# Patient Record
Sex: MALE | Race: BLACK OR AFRICAN AMERICAN | NOT HISPANIC OR LATINO | URBAN - METROPOLITAN AREA
[De-identification: names, ages, dates, MRNs, and addresses within clinical notes are randomized per-mention and may not be internally consistent; named-entity substitution may affect disease eponyms.]

---

## 2021-09-09 ENCOUNTER — INPATIENT (INPATIENT)
Facility: HOSPITAL | Age: 71
LOS: 0 days | Discharge: HOME | DRG: 313 | End: 2021-09-10
Attending: INTERNAL MEDICINE | Admitting: INTERNAL MEDICINE
Payer: MEDICARE

## 2021-09-09 ENCOUNTER — APPOINTMENT (OUTPATIENT)
Dept: HEART AND VASCULAR | Facility: CLINIC | Age: 71
End: 2021-09-09
Payer: MEDICARE

## 2021-09-09 ENCOUNTER — NON-APPOINTMENT (OUTPATIENT)
Age: 71
End: 2021-09-09

## 2021-09-09 VITALS
HEIGHT: 67 IN | TEMPERATURE: 98.3 F | DIASTOLIC BLOOD PRESSURE: 90 MMHG | WEIGHT: 172 LBS | SYSTOLIC BLOOD PRESSURE: 132 MMHG | BODY MASS INDEX: 27 KG/M2

## 2021-09-09 VITALS
TEMPERATURE: 98 F | OXYGEN SATURATION: 96 % | DIASTOLIC BLOOD PRESSURE: 77 MMHG | SYSTOLIC BLOOD PRESSURE: 145 MMHG | HEIGHT: 66 IN | WEIGHT: 175.05 LBS | HEART RATE: 70 BPM | RESPIRATION RATE: 18 BRPM

## 2021-09-09 DIAGNOSIS — R07.9 CHEST PAIN, UNSPECIFIED: ICD-10-CM

## 2021-09-09 DIAGNOSIS — R73.03 PREDIABETES.: ICD-10-CM

## 2021-09-09 DIAGNOSIS — I10 ESSENTIAL (PRIMARY) HYPERTENSION: ICD-10-CM

## 2021-09-09 DIAGNOSIS — Z98.890 OTHER SPECIFIED POSTPROCEDURAL STATES: Chronic | ICD-10-CM

## 2021-09-09 DIAGNOSIS — E78.5 HYPERLIPIDEMIA, UNSPECIFIED: ICD-10-CM

## 2021-09-09 DIAGNOSIS — Z87.891 PERSONAL HISTORY OF NICOTINE DEPENDENCE: ICD-10-CM

## 2021-09-09 DIAGNOSIS — Z80.2 FAMILY HISTORY OF MALIGNANT NEOPLASM OF OTHER RESPIRATORY AND INTRATHORACIC ORGANS: ICD-10-CM

## 2021-09-09 DIAGNOSIS — R73.03 PREDIABETES: ICD-10-CM

## 2021-09-09 DIAGNOSIS — M25.512 PAIN IN LEFT SHOULDER: ICD-10-CM

## 2021-09-09 PROBLEM — Z00.00 ENCOUNTER FOR PREVENTIVE HEALTH EXAMINATION: Status: ACTIVE | Noted: 2021-09-09

## 2021-09-09 LAB
ANION GAP SERPL CALC-SCNC: 10 MMOL/L — SIGNIFICANT CHANGE UP (ref 5–17)
BASOPHILS # BLD AUTO: 0.04 K/UL — SIGNIFICANT CHANGE UP (ref 0–0.2)
BASOPHILS NFR BLD AUTO: 0.5 % — SIGNIFICANT CHANGE UP (ref 0–2)
BUN SERPL-MCNC: 11 MG/DL — SIGNIFICANT CHANGE UP (ref 7–23)
CALCIUM SERPL-MCNC: 9.9 MG/DL — SIGNIFICANT CHANGE UP (ref 8.4–10.5)
CHLORIDE SERPL-SCNC: 108 MMOL/L — SIGNIFICANT CHANGE UP (ref 96–108)
CK MB CFR SERPL CALC: 2.3 NG/ML — SIGNIFICANT CHANGE UP (ref 0–6.7)
CK SERPL-CCNC: 200 U/L — SIGNIFICANT CHANGE UP (ref 30–200)
CO2 SERPL-SCNC: 23 MMOL/L — SIGNIFICANT CHANGE UP (ref 22–31)
CREAT SERPL-MCNC: 1.16 MG/DL — SIGNIFICANT CHANGE UP (ref 0.5–1.3)
EOSINOPHIL # BLD AUTO: 0.06 K/UL — SIGNIFICANT CHANGE UP (ref 0–0.5)
EOSINOPHIL NFR BLD AUTO: 0.8 % — SIGNIFICANT CHANGE UP (ref 0–6)
GLUCOSE SERPL-MCNC: 83 MG/DL — SIGNIFICANT CHANGE UP (ref 70–99)
HCT VFR BLD CALC: 43.8 % — SIGNIFICANT CHANGE UP (ref 39–50)
HGB BLD-MCNC: 14.1 G/DL — SIGNIFICANT CHANGE UP (ref 13–17)
IMM GRANULOCYTES NFR BLD AUTO: 0.3 % — SIGNIFICANT CHANGE UP (ref 0–1.5)
LYMPHOCYTES # BLD AUTO: 2.56 K/UL — SIGNIFICANT CHANGE UP (ref 1–3.3)
LYMPHOCYTES # BLD AUTO: 34.9 % — SIGNIFICANT CHANGE UP (ref 13–44)
MCHC RBC-ENTMCNC: 29 PG — SIGNIFICANT CHANGE UP (ref 27–34)
MCHC RBC-ENTMCNC: 32.2 GM/DL — SIGNIFICANT CHANGE UP (ref 32–36)
MCV RBC AUTO: 89.9 FL — SIGNIFICANT CHANGE UP (ref 80–100)
MONOCYTES # BLD AUTO: 0.73 K/UL — SIGNIFICANT CHANGE UP (ref 0–0.9)
MONOCYTES NFR BLD AUTO: 10 % — SIGNIFICANT CHANGE UP (ref 2–14)
NEUTROPHILS # BLD AUTO: 3.92 K/UL — SIGNIFICANT CHANGE UP (ref 1.8–7.4)
NEUTROPHILS NFR BLD AUTO: 53.5 % — SIGNIFICANT CHANGE UP (ref 43–77)
NRBC # BLD: 0 /100 WBCS — SIGNIFICANT CHANGE UP (ref 0–0)
PLATELET # BLD AUTO: 211 K/UL — SIGNIFICANT CHANGE UP (ref 150–400)
POTASSIUM SERPL-MCNC: 4.4 MMOL/L — SIGNIFICANT CHANGE UP (ref 3.5–5.3)
POTASSIUM SERPL-SCNC: 4.4 MMOL/L — SIGNIFICANT CHANGE UP (ref 3.5–5.3)
RBC # BLD: 4.87 M/UL — SIGNIFICANT CHANGE UP (ref 4.2–5.8)
RBC # FLD: 14.1 % — SIGNIFICANT CHANGE UP (ref 10.3–14.5)
SARS-COV-2 RNA SPEC QL NAA+PROBE: NEGATIVE — SIGNIFICANT CHANGE UP
SODIUM SERPL-SCNC: 141 MMOL/L — SIGNIFICANT CHANGE UP (ref 135–145)
TROPONIN T SERPL-MCNC: <0.01 NG/ML — SIGNIFICANT CHANGE UP (ref 0–0.01)
WBC # BLD: 7.33 K/UL — SIGNIFICANT CHANGE UP (ref 3.8–10.5)
WBC # FLD AUTO: 7.33 K/UL — SIGNIFICANT CHANGE UP (ref 3.8–10.5)

## 2021-09-09 PROCEDURE — 99285 EMERGENCY DEPT VISIT HI MDM: CPT | Mod: 25

## 2021-09-09 PROCEDURE — 93015 CV STRESS TEST SUPVJ I&R: CPT

## 2021-09-09 PROCEDURE — 71045 X-RAY EXAM CHEST 1 VIEW: CPT | Mod: 26

## 2021-09-09 PROCEDURE — 99205 OFFICE O/P NEW HI 60 MIN: CPT | Mod: 25

## 2021-09-09 PROCEDURE — 93010 ELECTROCARDIOGRAM REPORT: CPT

## 2021-09-09 PROCEDURE — 71045 X-RAY EXAM CHEST 1 VIEW: CPT | Mod: 26,77

## 2021-09-09 RX ORDER — OXYCODONE AND ACETAMINOPHEN 5; 325 MG/1; MG/1
1 TABLET ORAL EVERY 6 HOURS
Refills: 0 | Status: DISCONTINUED | OUTPATIENT
Start: 2021-09-09 | End: 2021-09-10

## 2021-09-09 RX ORDER — SODIUM CHLORIDE 9 MG/ML
1000 INJECTION, SOLUTION INTRAVENOUS
Refills: 0 | Status: DISCONTINUED | OUTPATIENT
Start: 2021-09-09 | End: 2021-09-10

## 2021-09-09 RX ORDER — DEXTROSE 50 % IN WATER 50 %
15 SYRINGE (ML) INTRAVENOUS ONCE
Refills: 0 | Status: DISCONTINUED | OUTPATIENT
Start: 2021-09-09 | End: 2021-09-10

## 2021-09-09 RX ORDER — ATORVASTATIN CALCIUM 80 MG/1
1 TABLET, FILM COATED ORAL
Qty: 0 | Refills: 0 | DISCHARGE

## 2021-09-09 RX ORDER — ALPRAZOLAM 0.25 MG
0 TABLET ORAL
Qty: 0 | Refills: 0 | DISCHARGE

## 2021-09-09 RX ORDER — LOSARTAN POTASSIUM 50 MG/1
50 TABLET, FILM COATED ORAL
Refills: 0 | Status: ACTIVE | COMMUNITY

## 2021-09-09 RX ORDER — OXYCODONE AND ACETAMINOPHEN 5; 325 MG/1; MG/1
1 TABLET ORAL EVERY 6 HOURS
Refills: 0 | Status: DISCONTINUED | OUTPATIENT
Start: 2021-09-09 | End: 2021-09-09

## 2021-09-09 RX ORDER — ASPIRIN/CALCIUM CARB/MAGNESIUM 324 MG
325 TABLET ORAL ONCE
Refills: 0 | Status: COMPLETED | OUTPATIENT
Start: 2021-09-09 | End: 2021-09-09

## 2021-09-09 RX ORDER — AMLODIPINE BESYLATE 10 MG/1
10 TABLET ORAL
Refills: 0 | Status: ACTIVE | COMMUNITY

## 2021-09-09 RX ORDER — ALPRAZOLAM 0.25 MG/1
0.25 TABLET ORAL
Refills: 0 | Status: ACTIVE | COMMUNITY

## 2021-09-09 RX ORDER — ATORVASTATIN CALCIUM 40 MG/1
40 TABLET, FILM COATED ORAL
Refills: 0 | Status: ACTIVE | COMMUNITY

## 2021-09-09 RX ORDER — GABAPENTIN 400 MG/1
0 CAPSULE ORAL
Qty: 0 | Refills: 0 | DISCHARGE

## 2021-09-09 RX ORDER — METOPROLOL TARTRATE 25 MG/1
25 TABLET, FILM COATED ORAL
Refills: 0 | Status: ACTIVE | COMMUNITY

## 2021-09-09 RX ORDER — AMLODIPINE BESYLATE 2.5 MG/1
1 TABLET ORAL
Qty: 0 | Refills: 0 | DISCHARGE

## 2021-09-09 RX ORDER — AMLODIPINE BESYLATE 2.5 MG/1
10 TABLET ORAL DAILY
Refills: 0 | Status: DISCONTINUED | OUTPATIENT
Start: 2021-09-09 | End: 2021-09-10

## 2021-09-09 RX ORDER — DEXTROSE 50 % IN WATER 50 %
25 SYRINGE (ML) INTRAVENOUS ONCE
Refills: 0 | Status: DISCONTINUED | OUTPATIENT
Start: 2021-09-09 | End: 2021-09-10

## 2021-09-09 RX ORDER — GABAPENTIN 300 MG/1
300 CAPSULE ORAL
Refills: 0 | Status: ACTIVE | COMMUNITY

## 2021-09-09 RX ORDER — GABAPENTIN 400 MG/1
300 CAPSULE ORAL DAILY
Refills: 0 | Status: DISCONTINUED | OUTPATIENT
Start: 2021-09-09 | End: 2021-09-10

## 2021-09-09 RX ORDER — ALPRAZOLAM 0.25 MG
0.25 TABLET ORAL DAILY
Refills: 0 | Status: DISCONTINUED | OUTPATIENT
Start: 2021-09-09 | End: 2021-09-10

## 2021-09-09 RX ORDER — DEXTROSE 50 % IN WATER 50 %
12.5 SYRINGE (ML) INTRAVENOUS ONCE
Refills: 0 | Status: DISCONTINUED | OUTPATIENT
Start: 2021-09-09 | End: 2021-09-10

## 2021-09-09 RX ORDER — ATORVASTATIN CALCIUM 80 MG/1
40 TABLET, FILM COATED ORAL AT BEDTIME
Refills: 0 | Status: DISCONTINUED | OUTPATIENT
Start: 2021-09-09 | End: 2021-09-10

## 2021-09-09 RX ORDER — GLUCAGON INJECTION, SOLUTION 0.5 MG/.1ML
1 INJECTION, SOLUTION SUBCUTANEOUS ONCE
Refills: 0 | Status: DISCONTINUED | OUTPATIENT
Start: 2021-09-09 | End: 2021-09-10

## 2021-09-09 RX ORDER — INSULIN LISPRO 100/ML
VIAL (ML) SUBCUTANEOUS
Refills: 0 | Status: DISCONTINUED | OUTPATIENT
Start: 2021-09-09 | End: 2021-09-10

## 2021-09-09 RX ORDER — ASPIRIN/CALCIUM CARB/MAGNESIUM 324 MG
81 TABLET ORAL DAILY
Refills: 0 | Status: DISCONTINUED | OUTPATIENT
Start: 2021-09-10 | End: 2021-09-10

## 2021-09-09 RX ORDER — HEPARIN SODIUM 5000 [USP'U]/ML
5000 INJECTION INTRAVENOUS; SUBCUTANEOUS EVERY 8 HOURS
Refills: 0 | Status: DISCONTINUED | OUTPATIENT
Start: 2021-09-09 | End: 2021-09-10

## 2021-09-09 RX ADMIN — OXYCODONE AND ACETAMINOPHEN 1 TABLET(S): 5; 325 TABLET ORAL at 23:51

## 2021-09-09 RX ADMIN — Medication 0.25 MILLIGRAM(S): at 23:13

## 2021-09-09 RX ADMIN — ATORVASTATIN CALCIUM 40 MILLIGRAM(S): 80 TABLET, FILM COATED ORAL at 23:13

## 2021-09-09 RX ADMIN — HEPARIN SODIUM 5000 UNIT(S): 5000 INJECTION INTRAVENOUS; SUBCUTANEOUS at 23:13

## 2021-09-09 RX ADMIN — GABAPENTIN 300 MILLIGRAM(S): 400 CAPSULE ORAL at 23:13

## 2021-09-09 RX ADMIN — Medication 325 MILLIGRAM(S): at 16:46

## 2021-09-09 NOTE — ED PROVIDER NOTE - OBJECTIVE STATEMENT
Pt w/ PMHx HTN, HLD, pre-DM referred to the ED s/p failed cardiac stress test. Pt has been having intermittent CP (2 months of exertional CP), saw cardiologist Dr Ramos. Pt reports he was having CP, SOB, fatigue during the stress test, so the test was stopped. Pt had ? episode of hypotension. Pt had echo w/o pericardial effusion. Pt was referred to the ED for admission to cardio. Pt fully vaccinated against COVID19. No infectious sx.

## 2021-09-09 NOTE — ED ADULT TRIAGE NOTE - OTHER COMPLAINTS
patient c/o CP, SOB, and SAVAGE x 2-3 months-- speaking in full, complete sentences-- denies cardiac hx

## 2021-09-09 NOTE — ED PROVIDER NOTE - CLINICAL SUMMARY MEDICAL DECISION MAKING FREE TEXT BOX
Pt w/ 2 months of exertional CP s/p outpt abnormal treadmill stress earlier today, referred to the ED for further eval. D/w Dr Hwang prior to pt arrival, recommends CTA coronary vs admission to cardio to r/o ACS. Unable to obtain CTA at this time. There are no EKG changes to suggest ischemia, infarction, or pericarditis. H&P is not c/w dissection, nor PE. ASA given. Will admit to cardio for further eval. D/w cardio on call Dr Nugent

## 2021-09-09 NOTE — REASON FOR VISIT
[FreeTextEntry1] : pt with pain walking upstairs like pressing down in chest pt has this for two months saw cardiologist thought it was muscle pain pt with hypertension borderline diabetes and high cholesterol pt s/p prostate surgery

## 2021-09-09 NOTE — H&P ADULT - NSHPSOCIALHISTORY_GEN_ALL_CORE
ETOH:  Tobacco:  illicit drug use: ETOH: denies  Tobacco: former tobacco use; quit 15 years ago; smoke 1 PPD x 4-5 years  illicit drug use: denies

## 2021-09-09 NOTE — H&P ADULT - PROBLEM SELECTOR PLAN 4
- SSI  - f/u am HgBA1c    DVT PPX: Heparin Sub Q  Dispo: NPO after MN for CCTA - SSI  - f/u am HgBA1c

## 2021-09-09 NOTE — ED PROVIDER NOTE - PHYSICAL EXAMINATION
Constitutional: Well appearing, awake, alert, oriented to person, place, time/situation and in no apparent distress.  ENMT: Airway patent. Normal MM  Eyes: Clear bilaterally  Cardiac: Normal rate, regular rhythm.  Heart sounds S1, S2.  No murmurs, rubs or gallops. No JVD or LE edema.  Respiratory: Breaths sounds equal and clear b/l. No increased WOB, tachypnea, hypoxia, or accessory mm use. Pt speaks in full sentences.   Gastrointestinal: Abd soft, NT, ND, NABS. No guarding, rebound, or rigidity. No pulsatile abdominal masses.   Musculoskeletal: Range of motion is not limited  Neuro: Alert and oriented x 3, face symmetric and speech fluent. Strength 5/5 x 4 ext and symmetric, nml gross motor movement, nml gait. No focal deficits noted.  Skin: Skin normal color for race, warm, dry and intact. No evidence of rash.  Psych: Alert and oriented to person, place, time/situation. normal mood and affect. no apparent risk to self or others.

## 2021-09-09 NOTE — ASSESSMENT
[FreeTextEntry1] : Patient with chest discomfort shortness of breath and respiratory variation on EKG walking on the treadmill for less than a minute and a half a partial echocardiogram did not show a pericardial effusion and showed normal LV function patient will need a full echocardiogram and a CTA to define his shortness of breath also will be important to look at the patient's lungs so please ask for complete lung fields with the CTA patient is hypertensive borderline diabetic and hyperlipidemic patient has been having chest discomfort and shortness of breath of breath on mild exertion for the past 2 months

## 2021-09-09 NOTE — H&P ADULT - NSHPLABSRESULTS_GEN_ALL_CORE
14.1   7.33  )-----------( 211      ( 09 Sep 2021 16:15 )             43.8   09-09    141  |  108  |  11  ----------------------------<  83  4.4   |  23  |  1.16    Ca    9.9      09 Sep 2021 16:15

## 2021-09-09 NOTE — H&P ADULT - ASSESSMENT
72 y/o M former smoker with PMH of HTN, HLD, pre-DM, prostate cancer s/p surgery who is now admitted to cardiology service for further management of chest pain.

## 2021-09-09 NOTE — H&P ADULT - HISTORY OF PRESENT ILLNESS
INCOMPLETE !!!   72 y/o M with PMH of HTN, HLD, pre-DM who was referred to [facility] ED 9/9/21 by cardiologist Dr. Ramos after failed cardiac tress test.   At baseline, pt , reports having intermittent chest pain associated with SOB x 2 months.     In ED, /77 HR 70 RR 18 T 97.8 02 96 RA, Labs revealed Trop T negative X 1, COVID negative, EKG: NSR @ 88 with PVCs, no ST-T changes noted.   CXR:   In ED, pt. received  mg PO X 1 and is now admitted to cardiology service for further management of chest pain.        70 y/o M former smoker with PMH of HTN, HLD, pre-DM, prostate cancer s/p surgery 2008 who was referred to [facility] ED 9/9/21 by cardiologist Dr. Ramos after failed cardiac tress test. At baseline, pt reports having L sided chest pressure  radiating to his L arm /back  associated with SOB/dizziness occuring on ambulating 1-2 blocks or climbing 1 flight of stairs for the last 2 months which is worse with exertion and relieved with rest. he also reports having intermittent chest pain at rest too. As per ER s/o; pt. underwent stress test today which was aborted 2/2 chest pain, SOB while walking on treadmill for less than a minute, also had limited ECHO in office revealing normal LV function with no pericardial effusion. Pt. currently denies any chest pain, SOB, dizziness, palpitation, N/V, LE edema, PND/orthopnea, recent travel, sick contacts, syncope. In ED, /77 HR 70 RR 18 T 97.8 02 96 RA, Labs revealed Trop T negative X 1, COVID negative, EKG: NSR @ 88 with PVCs, no ST-T changes noted. CXR prelim read clear;  In ED, pt. received  mg PO X 1 and is now admitted to cardiology service for further management of chest pain.     Meds confirmed with pharmacy.

## 2021-09-09 NOTE — H&P ADULT - PROBLEM SELECTOR PLAN 1
- currently chest pain free  - trop T neg X 1, f/u 8 pm and am trop T  - EKG non-ischemic; f/u am EKG  - s/p failed stress test o/p 9/9 2/2 chest pain and fatigue  - ECHO ordered. f/u  - NPO after MN for CCTA in am

## 2021-09-09 NOTE — ED ADULT NURSE NOTE - OBJECTIVE STATEMENT
Pt is a 70 y/o male A&Ox4 in NAD ambulatory with steady gait c/o chest pain and SAVAGE x2-3 months. Pt denies cardiac hx, fever/chills, N/V/D. Pt talking in clear, full sentences, respirations even and unlabored, EKG done and signed.

## 2021-09-09 NOTE — H&P ADULT - PROBLEM SELECTOR PLAN 5
- patient reports that he needs surgery (no date fixed yet; reuqires cardiac clearance)  - c/w home oxycodone/acetaminophen 4 X day  - c/w gabapentin 300 mg daily at bedtime    DVT PPX: Heparin Sub Q  Dispo: NPO after MN for CCTA

## 2021-09-10 ENCOUNTER — TRANSCRIPTION ENCOUNTER (OUTPATIENT)
Age: 71
End: 2021-09-10

## 2021-09-10 VITALS
RESPIRATION RATE: 16 BRPM | DIASTOLIC BLOOD PRESSURE: 66 MMHG | OXYGEN SATURATION: 97 % | HEART RATE: 55 BPM | SYSTOLIC BLOOD PRESSURE: 120 MMHG

## 2021-09-10 LAB
A1C WITH ESTIMATED AVERAGE GLUCOSE RESULT: 5.7 % — HIGH (ref 4–5.6)
ANION GAP SERPL CALC-SCNC: 11 MMOL/L — SIGNIFICANT CHANGE UP (ref 5–17)
APTT BLD: 35.7 SEC — HIGH (ref 27.5–35.5)
BUN SERPL-MCNC: 11 MG/DL — SIGNIFICANT CHANGE UP (ref 7–23)
CALCIUM SERPL-MCNC: 9.3 MG/DL — SIGNIFICANT CHANGE UP (ref 8.4–10.5)
CHLORIDE SERPL-SCNC: 107 MMOL/L — SIGNIFICANT CHANGE UP (ref 96–108)
CHOLEST SERPL-MCNC: 127 MG/DL — SIGNIFICANT CHANGE UP
CO2 SERPL-SCNC: 22 MMOL/L — SIGNIFICANT CHANGE UP (ref 22–31)
COVID-19 SPIKE DOMAIN AB INTERP: POSITIVE
COVID-19 SPIKE DOMAIN ANTIBODY RESULT: >250 U/ML — HIGH
CREAT SERPL-MCNC: 1.08 MG/DL — SIGNIFICANT CHANGE UP (ref 0.5–1.3)
D DIMER BLD IA.RAPID-MCNC: 253 NG/ML DDU — HIGH
ESTIMATED AVERAGE GLUCOSE: 117 MG/DL — HIGH (ref 68–114)
GLUCOSE SERPL-MCNC: 78 MG/DL — SIGNIFICANT CHANGE UP (ref 70–99)
HCT VFR BLD CALC: 43.5 % — SIGNIFICANT CHANGE UP (ref 39–50)
HCV AB S/CO SERPL IA: 153.3 S/CO — HIGH
HCV AB SERPL-IMP: REACTIVE
HDLC SERPL-MCNC: 49 MG/DL — SIGNIFICANT CHANGE UP
HGB BLD-MCNC: 13.7 G/DL — SIGNIFICANT CHANGE UP (ref 13–17)
INR BLD: 1.08 — SIGNIFICANT CHANGE UP (ref 0.88–1.16)
LIPID PNL WITH DIRECT LDL SERPL: 68 MG/DL — SIGNIFICANT CHANGE UP
MAGNESIUM SERPL-MCNC: 2.1 MG/DL — SIGNIFICANT CHANGE UP (ref 1.6–2.6)
MCHC RBC-ENTMCNC: 28.6 PG — SIGNIFICANT CHANGE UP (ref 27–34)
MCHC RBC-ENTMCNC: 31.5 GM/DL — LOW (ref 32–36)
MCV RBC AUTO: 90.8 FL — SIGNIFICANT CHANGE UP (ref 80–100)
NON HDL CHOLESTEROL: 78 MG/DL — SIGNIFICANT CHANGE UP
NRBC # BLD: 0 /100 WBCS — SIGNIFICANT CHANGE UP (ref 0–0)
PLATELET # BLD AUTO: 203 K/UL — SIGNIFICANT CHANGE UP (ref 150–400)
POTASSIUM SERPL-MCNC: 4.2 MMOL/L — SIGNIFICANT CHANGE UP (ref 3.5–5.3)
POTASSIUM SERPL-SCNC: 4.2 MMOL/L — SIGNIFICANT CHANGE UP (ref 3.5–5.3)
PROTHROM AB SERPL-ACNC: 12.9 SEC — SIGNIFICANT CHANGE UP (ref 10.6–13.6)
PSA FLD-MCNC: <0.01 NG/ML — SIGNIFICANT CHANGE UP (ref 0–4)
RBC # BLD: 4.79 M/UL — SIGNIFICANT CHANGE UP (ref 4.2–5.8)
RBC # FLD: 14.2 % — SIGNIFICANT CHANGE UP (ref 10.3–14.5)
SARS-COV-2 IGG+IGM SERPL QL IA: >250 U/ML — HIGH
SARS-COV-2 IGG+IGM SERPL QL IA: POSITIVE
SODIUM SERPL-SCNC: 140 MMOL/L — SIGNIFICANT CHANGE UP (ref 135–145)
TRIGL SERPL-MCNC: 51 MG/DL — SIGNIFICANT CHANGE UP
TROPONIN T SERPL-MCNC: 0.01 NG/ML — SIGNIFICANT CHANGE UP (ref 0–0.01)
TSH SERPL-MCNC: 0.91 UIU/ML — SIGNIFICANT CHANGE UP (ref 0.27–4.2)
WBC # BLD: 5.63 K/UL — SIGNIFICANT CHANGE UP (ref 3.8–10.5)
WBC # FLD AUTO: 5.63 K/UL — SIGNIFICANT CHANGE UP (ref 3.8–10.5)

## 2021-09-10 PROCEDURE — 86803 HEPATITIS C AB TEST: CPT

## 2021-09-10 PROCEDURE — 84484 ASSAY OF TROPONIN QUANT: CPT

## 2021-09-10 PROCEDURE — 85027 COMPLETE CBC AUTOMATED: CPT

## 2021-09-10 PROCEDURE — 83735 ASSAY OF MAGNESIUM: CPT

## 2021-09-10 PROCEDURE — 82553 CREATINE MB FRACTION: CPT

## 2021-09-10 PROCEDURE — 80061 LIPID PANEL: CPT

## 2021-09-10 PROCEDURE — 82550 ASSAY OF CK (CPK): CPT

## 2021-09-10 PROCEDURE — 99222 1ST HOSP IP/OBS MODERATE 55: CPT

## 2021-09-10 PROCEDURE — 85730 THROMBOPLASTIN TIME PARTIAL: CPT

## 2021-09-10 PROCEDURE — 85610 PROTHROMBIN TIME: CPT

## 2021-09-10 PROCEDURE — 93306 TTE W/DOPPLER COMPLETE: CPT

## 2021-09-10 PROCEDURE — 80048 BASIC METABOLIC PNL TOTAL CA: CPT

## 2021-09-10 PROCEDURE — 86769 SARS-COV-2 COVID-19 ANTIBODY: CPT

## 2021-09-10 PROCEDURE — G0103: CPT

## 2021-09-10 PROCEDURE — 87635 SARS-COV-2 COVID-19 AMP PRB: CPT

## 2021-09-10 PROCEDURE — 93306 TTE W/DOPPLER COMPLETE: CPT | Mod: 26

## 2021-09-10 PROCEDURE — 84443 ASSAY THYROID STIM HORMONE: CPT

## 2021-09-10 PROCEDURE — 85025 COMPLETE CBC W/AUTO DIFF WBC: CPT

## 2021-09-10 PROCEDURE — 93010 ELECTROCARDIOGRAM REPORT: CPT

## 2021-09-10 PROCEDURE — 83036 HEMOGLOBIN GLYCOSYLATED A1C: CPT

## 2021-09-10 PROCEDURE — 36415 COLL VENOUS BLD VENIPUNCTURE: CPT

## 2021-09-10 PROCEDURE — 75574 CT ANGIO HRT W/3D IMAGE: CPT | Mod: MB

## 2021-09-10 PROCEDURE — 93005 ELECTROCARDIOGRAM TRACING: CPT

## 2021-09-10 PROCEDURE — 82962 GLUCOSE BLOOD TEST: CPT

## 2021-09-10 PROCEDURE — 99285 EMERGENCY DEPT VISIT HI MDM: CPT

## 2021-09-10 PROCEDURE — 71045 X-RAY EXAM CHEST 1 VIEW: CPT

## 2021-09-10 PROCEDURE — 75574 CT ANGIO HRT W/3D IMAGE: CPT | Mod: 26

## 2021-09-10 PROCEDURE — 85379 FIBRIN DEGRADATION QUANT: CPT

## 2021-09-10 RX ORDER — METOPROLOL TARTRATE 50 MG
1 TABLET ORAL
Qty: 30 | Refills: 3
Start: 2021-09-10 | End: 2022-01-07

## 2021-09-10 RX ADMIN — GABAPENTIN 300 MILLIGRAM(S): 400 CAPSULE ORAL at 12:39

## 2021-09-10 RX ADMIN — AMLODIPINE BESYLATE 10 MILLIGRAM(S): 2.5 TABLET ORAL at 06:12

## 2021-09-10 RX ADMIN — OXYCODONE AND ACETAMINOPHEN 1 TABLET(S): 5; 325 TABLET ORAL at 00:51

## 2021-09-10 RX ADMIN — Medication 81 MILLIGRAM(S): at 13:34

## 2021-09-10 RX ADMIN — Medication 0.25 MILLIGRAM(S): at 12:39

## 2021-09-10 NOTE — PROGRESS NOTE ADULT - ATTENDING COMMENTS
Initial attending contact date 9/10/21     . See PA note written above for details. I reviewed the PA documentation. I have personally seen and examined this patient. I reviewed vitals, labs, medications, cardiac studies, and additional imaging. I agree with the above PA's findings and plans as written above with the following additions/statements.    72 y/o M former smoker with PMH of HTN, HLD, pre-DM, prostate cancer s/p surgery 2008 who was referred to [facility] ED 9/9/21 by cardiologist Dr. Ramos after developing CP during EKG stress test    -currently CP free, EKG NSR nonischemic, Trop negative  -ECHO with normal LVEF  -Plan for CCTA per Dr Ramos  -Dispo pending results

## 2021-09-10 NOTE — DISCHARGE NOTE PROVIDER - HOSPITAL COURSE
70 y/o M former smoker with PMH of HTN, HLD, pre-DM, prostate cancer s/p surgery 2008 who was referred to [facility] ED 9/9/21 by cardiologist Dr. Ramos after failed cardiac tress test. At baseline, pt reports having L sided chest pressure  radiating to his L arm /back  associated with SOB/dizziness occuring on ambulating 1-2 blocks or climbing 1 flight of stairs for the last 2 months which is worse with exertion and relieved with rest. he also reports having intermittent chest pain at rest too. As per ER s/o; pt. underwent stress test today which was aborted 2/2 chest pain, SOB while walking on treadmill for less than a minute, also had limited ECHO in office revealing normal LV function with no pericardial effusion. Pt. currently denies any chest pain, SOB, dizziness, palpitation, N/V, LE edema, PND/orthopnea, recent travel, sick contacts, syncope. In ED, /77 HR 70 RR 18 T 97.8 02 96 RA, Labs revealed Trop T negative X 1, COVID negative, EKG: NSR @ 88 with PVCs, no ST-T changes noted. CXR prelim read clear;  In ED, pt. received  mg PO X 1 and was admitted to cardiology service for further management of chest pain.     Pt troponin eventually trended to be negative 3 times. D dimer drawn and was ~250, age adjusted negative. CCTA 9/10/21: Ca score 10. Mid LAD myocardial bridge. Minimal stenosis in D1, mLAD, pRCA. dRCA. Mild stenosis in ramus. Echo 9/10: No significant valvular disease.  The right ventricle is mildly dilated. Right ventricular systolic function is normal. The left ventricle is normal in size, wall thickness, and systolic function with a calculated ejection fraction of 56%. No pericardial effusion.     Pt is out of bed and ambulating with no complaints. Lab values, telemetry, and vital signs reviewed and remained stable. Discharge medication regimen reviewed with patient and Dr. Nugent. Pt will continue cardiac meds: lipitor 40mg daily, Norvasc 10mg daily. Pt will start Toprol 25mg daily for the myocardial brigde. All discharge instructions reviewed with patient and medications e-prescribed to pharmacy. Pt is cleared for discharge per Dr. Nugent, and will follow up with Dr. Ramos within 1-2 weeks of discharge.

## 2021-09-10 NOTE — PROGRESS NOTE ADULT - SUBJECTIVE AND OBJECTIVE BOX
Interventional Cardiology PA Adult Progress Note    C.C.:     Subjective Assessment:      ROS Negative except as per Subjective and HPI  	  MEDICATIONS:  amLODIPine   Tablet 10 milliGRAM(s) Oral daily        ALPRAZolam 0.25 milliGRAM(s) Oral daily  gabapentin 300 milliGRAM(s) Oral daily  oxycodone    5 mG/acetaminophen 325 mG 1 Tablet(s) Oral every 6 hours PRN      atorvastatin 40 milliGRAM(s) Oral at bedtime  dextrose 40% Gel 15 Gram(s) Oral once  dextrose 50% Injectable 25 Gram(s) IV Push once  dextrose 50% Injectable 12.5 Gram(s) IV Push once  dextrose 50% Injectable 25 Gram(s) IV Push once  glucagon  Injectable 1 milliGRAM(s) IntraMuscular once  insulin lispro (ADMELOG) corrective regimen sliding scale   SubCutaneous Before meals and at bedtime    aspirin enteric coated 81 milliGRAM(s) Oral daily  dextrose 5%. 1000 milliLiter(s) IV Continuous <Continuous>  dextrose 5%. 1000 milliLiter(s) IV Continuous <Continuous>  heparin   Injectable 5000 Unit(s) SubCutaneous every 8 hours      	    [PHYSICAL EXAM:  TELEMETRY:  T(C): 36.9 (09-10-21 @ 08:35), Max: 36.9 (09-10-21 @ 08:35)  HR: 65 (09-10-21 @ 08:52) (58 - 80)  BP: 134/79 (09-10-21 @ 08:52) (128/75 - 145/77)  RR: 16 (09-10-21 @ 08:52) (16 - 18)  SpO2: 98% (09-10-21 @ 08:52) (92% - 98%)  Wt(kg): --  I&O's Summary    09 Sep 2021 07:01  -  10 Sep 2021 07:00  --------------------------------------------------------  IN: 0 mL / OUT: 700 mL / NET: -700 mL    10 Sep 2021 07:01  -  10 Sep 2021 12:17  --------------------------------------------------------  IN: 0 mL / OUT: 0 mL / NET: 0 mL      Height (cm): 167.6 (09-09 @ 15:38)  Weight (kg): 79.4 (09-09 @ 15:38)  BMI (kg/m2): 28.3 (09-09 @ 15:38)  BSA (m2): 1.89 (09-09 @ 15:38)  Spivey:  Central/PICC/Mid Line:                                         Appearance: Normal	  HEENT:   Normal oral mucosa, PERRL, EOMI	  Neck: Supple, + JVD/ - JVD; Carotid Bruit   Cardiovascular: Normal S1 S2, No JVD, No murmurs,   Respiratory: Lungs clear to auscultation/Decreased Breath Sounds/No Rales, Rhonchi, Wheezing	  Gastrointestinal:  Soft, Non-tender, + BS	  Skin: No rashes, No ecchymoses, No cyanosis  Extremities: Normal range of motion, No clubbing, cyanosis or edema  Vascular: Peripheral pulses palpable 2+ bilaterally  Neurologic: Non-focal  Psychiatry: A & O x 3, Mood & affect appropriate      	    ECG:  	  RADIOLOGY:   DIAGNOSTIC TESTING:  [ ] Echocardiogram:  [ ]  Catheterization:  [ ] Stress Test:    [ ] NAKIA  OTHER: 	    LABS:	 	  CARDIAC MARKERS:                                  13.7   5.63  )-----------( 203      ( 10 Sep 2021 07:55 )             43.5     09-10    140  |  107  |  11  ----------------------------<  78  4.2   |  22  |  1.08    Ca    9.3      10 Sep 2021 07:55  Mg     2.1     09-10      proBNP:   Lipid Profile:   HgA1c:   TSH: Thyroid Stimulating Hormone, Serum: 0.912 uIU/mL (09-10 @ 07:55)    PT/INR - ( 10 Sep 2021 07:55 )   PT: 12.9 sec;   INR: 1.08          PTT - ( 10 Sep 2021 07:55 )  PTT:35.7 sec    ASSESSMENT/PLAN: 	        DVT ppx:  Dispo:

## 2021-09-10 NOTE — DISCHARGE NOTE PROVIDER - NSDCMRMEDTOKEN_GEN_ALL_CORE_FT
ALPRAZolam 0.25 mg oral tablet: orally once a day  gabapentin 300 mg/24 hours oral tablet, extended release: orally once a day (at bedtime)  Lipitor 40 mg oral tablet: 1 tab(s) orally once a day  Metoprolol Succinate ER 50 mg oral tablet, extended release: 1 tab(s) orally once a day   Norvasc 10 mg oral tablet: 1 tab(s) orally once a day  oxycodone-acetaminophen 7.5 mg-325 mg oral tablet: 1 tab(s) orally every 6 hours

## 2021-09-10 NOTE — DISCHARGE NOTE PROVIDER - NSDCCPCAREPLAN_GEN_ALL_CORE_FT
PRINCIPAL DISCHARGE DIAGNOSIS  Diagnosis: Chest pain  Assessment and Plan of Treatment: You came into the hospital with chest pain. Your echocardiogram did not show any major abnormalities. You also had a coronary CT angiogram (CT scan of your heart) that did not show any significant blockages. It did, however, reveal a myocardial bridge. As we discussed, this is when part of the artery dips down into the muscle, and can be squeezed shut when the muscle contracts, causing a lack of blood supply followed by chest pain.   Please START metoprolol succinate (Toprol) 25mg daily, as this will decrease the contractility of your heart muscle.   If you begin to experience lightheadedness, dizziness while taking this medication, please discontinue the medication and call Dr Ramos  Follow up with Dr Ramos in 1-2 weeks.      SECONDARY DISCHARGE DIAGNOSES  Diagnosis: HLD (hyperlipidemia)  Assessment and Plan of Treatment: Please continue atorvastatin 40mg daily for cholesterol.    Diagnosis: HTN (hypertension)  Assessment and Plan of Treatment: Please continue amlodipine 10mg daily, and START metoprolol succinate 25mg daily as above.

## 2021-09-10 NOTE — DISCHARGE NOTE PROVIDER - CARE PROVIDER_API CALL
Marilia Ramos)  Cardiovascular Disease; Internal Medicine  [address], [address]  [place], [place] [address]  Phone: [phone]  Fax: [phone]  Follow Up Time:

## 2021-09-10 NOTE — DISCHARGE NOTE NURSING/CASE MANAGEMENT/SOCIAL WORK - PATIENT PORTAL LINK FT
You can access the FollowMyHealth Patient Portal offered by [facility] by registering at the following website: [email or web address]. By joining [employer]’s FollowMyHealth portal, you will also be able to view your health information using other applications (apps) compatible with our system.

## 2021-09-14 DIAGNOSIS — Z87.891 PERSONAL HISTORY OF NICOTINE DEPENDENCE: ICD-10-CM

## 2021-09-14 DIAGNOSIS — R07.9 CHEST PAIN, UNSPECIFIED: ICD-10-CM

## 2021-09-14 DIAGNOSIS — E78.5 HYPERLIPIDEMIA, UNSPECIFIED: ICD-10-CM

## 2021-09-14 DIAGNOSIS — Z85.46 PERSONAL HISTORY OF MALIGNANT NEOPLASM OF PROSTATE: ICD-10-CM

## 2021-09-14 DIAGNOSIS — I25.10 ATHEROSCLEROTIC HEART DISEASE OF NATIVE CORONARY ARTERY WITHOUT ANGINA PECTORIS: ICD-10-CM

## 2021-09-14 DIAGNOSIS — Q24.5 MALFORMATION OF CORONARY VESSELS: ICD-10-CM

## 2021-09-14 DIAGNOSIS — I10 ESSENTIAL (PRIMARY) HYPERTENSION: ICD-10-CM

## 2021-09-15 LAB — HCV RNA FLD QL NAA+PROBE: SIGNIFICANT CHANGE UP

## 2023-03-31 NOTE — ED PROVIDER NOTE - CCCP TRG CHIEF CMPLNT
Recognize danger situations.  For example, stress, drinking alcohol, urges to smoke, smoking cues, cigarette availability
chest pain
